# Patient Record
Sex: MALE
[De-identification: names, ages, dates, MRNs, and addresses within clinical notes are randomized per-mention and may not be internally consistent; named-entity substitution may affect disease eponyms.]

---

## 2019-07-01 ENCOUNTER — HOSPITAL ENCOUNTER (OUTPATIENT)
Dept: HOSPITAL 25 - OR | Age: 74
Discharge: HOME | End: 2019-07-01
Attending: ORTHOPAEDIC SURGERY
Payer: MEDICARE

## 2019-07-01 VITALS — DIASTOLIC BLOOD PRESSURE: 73 MMHG | SYSTOLIC BLOOD PRESSURE: 128 MMHG

## 2019-07-01 DIAGNOSIS — Z87.891: ICD-10-CM

## 2019-07-01 DIAGNOSIS — M65.352: ICD-10-CM

## 2019-07-01 DIAGNOSIS — I10: ICD-10-CM

## 2019-07-01 DIAGNOSIS — M19.90: ICD-10-CM

## 2019-07-01 DIAGNOSIS — M65.342: ICD-10-CM

## 2019-07-01 DIAGNOSIS — M72.0: Primary | ICD-10-CM

## 2019-07-01 DIAGNOSIS — J45.909: ICD-10-CM

## 2019-07-01 PROCEDURE — 88304 TISSUE EXAM BY PATHOLOGIST: CPT

## 2019-07-01 NOTE — OP
DATE OF OPERATION:  07/01/19 - hospitals

 

DATE OF BIRTH:  [birth year]

 

SURGEON:  Walter Littlejohn MD.

 

ASSISTANT:  ANGIE Irving.

 

ANESTHESIOLOGIST:  Dr. Herrmann

 

ANESTHESIA:  General.

 

PRE-OP DIAGNOSES:

1.  Left ring and small finger Dupuytren's contractures.

2.  Left ring and small trigger fingers that are quite severe making it 
difficult for him to fully flex the fingers.

 

POST-OP DIAGNOSES:

1.  Left ring and small finger Dupuytren's contractures.

2.  Left ring and small trigger fingers that are quite severe making it 
difficult for him to fully flex the fingers.

 

OPERATIVE PROCEDURE:

1.  Excision of Dupuytren's disease left palm and ring finger.

2.  Excision of Dupuytren's disease left palm and small finger.

3.  Left ring trigger finger release.

4.  Left small trigger finger release.

 

INDICATIONS:  Orlin has the quite a bit of Dupuytren's disease in both hands.  
He developed pretty severe trigger fingers on the left ring and small fingers.  
We talked about his options.  He understands that after about a week after 
surgery, he is going to have to start working the hand very aggressively to 
prevent any tendon adhesions and try to work out that stiffness that has 
developed.  Unfortunately, the only way to be able to do the trigger finger 
releases is to excise all the Dupuytren's tissue too.  He wants to have this 
excised as well and see if he can get some relief of the contractures.  He has 
pretty significant Dupuytren's contractures on the right in the ring and small 
fingers as well.

 

ESTIMATED BLOOD LOSS:  5 mL.

 

COMPLICATIONS:  None.

 

FINDINGS:  See above and below.

 

DESCRIPTION OF PROCEDURE:  Orlin was seen in the preoperative holding area.  
The correct site, side, and procedure were identified.  We came back to the 
operating room where the arm was prepped and draped in the usual fashion and a 
time-out was performed.

 

The arm was exsanguinated with the Esmarch and the tourniquet was inflated to 
250 mmHg.  I went ahead and made Richelle type incisions over the ring and small 
fingers. This is a Y-type incision over the splitting near the distal palmar 
crease to branch it onto the ring and small fingers.  Dissection was carried 
down.  Full- thickness flaps were raised off the Dupuytren's cords.  I first 
dissected out the Dupuytren's tissue out onto the small finger.  There was a 
retrovascular cord out onto the small finger, and abductor cord. There was also 
a central cord. This was all dissected free and excised including the vertical 
septi released.  The release started proximally and worked out distally.  Great 
care was taken to preserve the neurovascular bundles.

 

I then went ahead in like manner dissected out all the Dupuytren's tissue out 
onto the ring finger where he had prominent PIP joint contracture.  There were 
prominent retrovascular cords radially and ulnarly.  This was all excised.  The 
most prominent was the ulnar retrovascular cord.  Again care was taken to 
preserve the neurovascular bundles.  It was fully excised.  Once I had released 
all of the cords, there was no more contractures and I had full passive motion 
of the joints.

 

I then went ahead and released the A1 pulley of the left small finger.

 

I then went ahead and released the A1 pulley of the left ring finger.

 

With the trigger finger releases, I lastly made a small V-shaped incision over 
the middle finger ray.  There was a central cord developing in the palm.  This 
was excised.

 

All the wounds were irrigated out.  The skin was closed with 4-0 nylon suture.  
I did cut back the skin, the apex of each V at the creases to give me a little 
bit more length in the skin.  The closures, it all came together very nicely.  
0.25% plain Marcaine was infiltrated.  The wounds were dressed with Xeroform, 
4x4's, sterile Webril, and then a short-arm splint out onto the ring and small 
fingers holding the MP and IP joints out in extension was applied.  Tourniquet 
was deflated and the fingers pinked up immediately.  He was taken to the 
recovery room in stable condition.

 

 [id number]/[facility] #: [id number]

SADI

## 2019-10-21 ENCOUNTER — HOSPITAL ENCOUNTER (OUTPATIENT)
Dept: HOSPITAL 25 - OR | Age: 74
Discharge: HOME | End: 2019-10-21
Attending: ORTHOPAEDIC SURGERY
Payer: MEDICARE

## 2019-10-21 VITALS — SYSTOLIC BLOOD PRESSURE: 130 MMHG | DIASTOLIC BLOOD PRESSURE: 64 MMHG

## 2019-10-21 DIAGNOSIS — R91.1: ICD-10-CM

## 2019-10-21 DIAGNOSIS — M19.90: ICD-10-CM

## 2019-10-21 DIAGNOSIS — M72.0: Primary | ICD-10-CM

## 2019-10-21 DIAGNOSIS — Z87.891: ICD-10-CM

## 2019-10-21 DIAGNOSIS — I10: ICD-10-CM

## 2019-10-21 DIAGNOSIS — J30.89: ICD-10-CM

## 2019-10-21 PROCEDURE — 88304 TISSUE EXAM BY PATHOLOGIST: CPT

## 2019-10-21 NOTE — OP
DATE OF OPERATION:  10/21/19 - \[facility]\""

 

DATE OF BIRTH:  [birth year]

 

SURGEON:  Walter Littlejohn MD.

 

ASSISTANT:  ANGIE Irving.

 

ANESTHESIOLOGIST:  Dr. Leonard.

 

ANESTHESIA:  General.

 

PRE-OP DIAGNOSIS:  Right hand Dupuytren disease.

 

POST-OP DIAGNOSIS:  Right hand Dupuytren disease.

 

OPERATIVE PROCEDURES:

1.  Excision of Dupuytren disease, right hand and small finger.

2.  Excision of Dupuytren disease, right hand and ring finger.

3.  Excision of Dupuytren disease, right thumb and hand.

 

ESTIMATED BLOOD LOSS:  5 mL.

 

COMPLICATIONS:  None.

 

FINDINGS:  See above and elbow.

 

DESCRIPTION OF PROCEDURE:  Orlin was seen in the preoperative holding area.  
The correct side, site, and procedures were identified.  We came back to the 
operating room.  The arm was prepped and draped in the usual fashion and a time-
out was performed.

 

The arm was exsanguinated with the Esmarch and the tourniquet was inflated to 
225 mmHg.  I first raised Richelle-type flaps in line over the ring finger, the 
small finger, and the thumb.  Additionally, he had a central cord in the palm 
in line with the middle finger, but not extending out on to the finger.  I 
raised full- thickness flaps off of each of the cords.  I first released the 
cord that was out on to the ring and small fingers.  I released that 
proximally.  I then followed it distally, releasing the vertical septi.  I 
first raised the very large cord out on to the small finger, releasing the 
contracture that he had there.  This was taken all the way down over the middle 
phalanx, where the cord was released and entirely removed.  This corrected the 
contracture.  The digital nerves had been preserved throughout the procedure.  
I then came to the ring finger and in like fashion, traced a retrovascular cord 
out on to the ring finger. This was fully excised in like manner.  I then 
excised the central cord in line with the middle finger.

 

Lastly, I released the cord over the palmar radial aspect of the thumb, 
released out proximally, and then traced it out distally, taking care to 
preserve the digital artery and nerve.  At this point, everything was looking 
very good.  The wound was irrigated out.  I did go ahead and let the tourniquet 
down to make sure all the fingers pinked up immediately.  Everything was 
looking really good.  I reinflated the tourniquet.

 

The wounds were all closed with 4-0 nylon suture.  The wounds were dressed with 
Xeroform, 4x4, sterile Webril and then a short arm splint with the fingers in 
extension was applied.  He was taken to the recovery room in stable condition.

 

 [id number]/[facility] #: [id number]

MTDD